# Patient Record
Sex: FEMALE | NOT HISPANIC OR LATINO | Employment: OTHER | URBAN - METROPOLITAN AREA
[De-identification: names, ages, dates, MRNs, and addresses within clinical notes are randomized per-mention and may not be internally consistent; named-entity substitution may affect disease eponyms.]

---

## 2019-07-19 ENCOUNTER — HOSPITAL ENCOUNTER (EMERGENCY)
Facility: HOSPITAL | Age: 29
Discharge: LEFT BEFORE DISCHARGE | End: 2019-07-20
Payer: MEDICAID

## 2019-07-19 VITALS
TEMPERATURE: 98 F | RESPIRATION RATE: 18 BRPM | HEART RATE: 100 BPM | WEIGHT: 157.44 LBS | DIASTOLIC BLOOD PRESSURE: 83 MMHG | OXYGEN SATURATION: 97 % | SYSTOLIC BLOOD PRESSURE: 123 MMHG

## 2019-07-19 DIAGNOSIS — Z53.20 LEFT BEFORE TREATMENT COMPLETED: Primary | ICD-10-CM

## 2019-07-19 DIAGNOSIS — R10.84 GENERALIZED ABDOMINAL PAIN: ICD-10-CM

## 2019-07-19 LAB
B-HCG UR QL: NEGATIVE
BILIRUB UR QL STRIP: NEGATIVE
CLARITY UR: CLEAR
COLOR UR: YELLOW
GLUCOSE UR QL STRIP: NEGATIVE
HGB UR QL STRIP: ABNORMAL
KETONES UR QL STRIP: NEGATIVE
LEUKOCYTE ESTERASE UR QL STRIP: NEGATIVE
MICROSCOPIC COMMENT: NORMAL
NITRITE UR QL STRIP: NEGATIVE
PH UR STRIP: 6 [PH] (ref 5–8)
PROT UR QL STRIP: NEGATIVE
RBC #/AREA URNS HPF: 1 /HPF (ref 0–4)
SP GR UR STRIP: 1.02 (ref 1–1.03)
URN SPEC COLLECT METH UR: ABNORMAL
UROBILINOGEN UR STRIP-ACNC: 1 EU/DL
WBC #/AREA URNS HPF: 1 /HPF (ref 0–5)

## 2019-07-19 PROCEDURE — 99285 EMERGENCY DEPT VISIT HI MDM: CPT

## 2019-07-19 PROCEDURE — 81025 URINE PREGNANCY TEST: CPT

## 2019-07-19 PROCEDURE — 81000 URINALYSIS NONAUTO W/SCOPE: CPT

## 2019-07-20 LAB
ALBUMIN SERPL BCP-MCNC: 4.2 G/DL (ref 3.5–5.2)
ALP SERPL-CCNC: 79 U/L (ref 55–135)
ALT SERPL W/O P-5'-P-CCNC: 50 U/L (ref 10–44)
ANION GAP SERPL CALC-SCNC: 13 MMOL/L (ref 8–16)
AST SERPL-CCNC: 33 U/L (ref 10–40)
BASOPHILS # BLD AUTO: 0.02 K/UL (ref 0–0.2)
BASOPHILS NFR BLD: 0.2 % (ref 0–1.9)
BILIRUB SERPL-MCNC: 0.6 MG/DL (ref 0.1–1)
BUN SERPL-MCNC: 15 MG/DL (ref 6–20)
CALCIUM SERPL-MCNC: 9.2 MG/DL (ref 8.7–10.5)
CHLORIDE SERPL-SCNC: 109 MMOL/L (ref 95–110)
CO2 SERPL-SCNC: 18 MMOL/L (ref 23–29)
CREAT SERPL-MCNC: 1.1 MG/DL (ref 0.5–1.4)
DIFFERENTIAL METHOD: ABNORMAL
EOSINOPHIL # BLD AUTO: 0.6 K/UL (ref 0–0.5)
EOSINOPHIL NFR BLD: 6.7 % (ref 0–8)
ERYTHROCYTE [DISTWIDTH] IN BLOOD BY AUTOMATED COUNT: 13 % (ref 11.5–14.5)
EST. GFR  (AFRICAN AMERICAN): >60 ML/MIN/1.73 M^2
EST. GFR  (NON AFRICAN AMERICAN): >60 ML/MIN/1.73 M^2
GLUCOSE SERPL-MCNC: 101 MG/DL (ref 70–110)
HCT VFR BLD AUTO: 34.9 % (ref 37–48.5)
HGB BLD-MCNC: 12.1 G/DL (ref 12–16)
LIPASE SERPL-CCNC: 20 U/L (ref 4–60)
LYMPHOCYTES # BLD AUTO: 3.8 K/UL (ref 1–4.8)
LYMPHOCYTES NFR BLD: 43 % (ref 18–48)
MCH RBC QN AUTO: 30.4 PG (ref 27–31)
MCHC RBC AUTO-ENTMCNC: 34.7 G/DL (ref 32–36)
MCV RBC AUTO: 88 FL (ref 82–98)
MONOCYTES # BLD AUTO: 0.9 K/UL (ref 0.3–1)
MONOCYTES NFR BLD: 10.1 % (ref 4–15)
NEUTROPHILS # BLD AUTO: 3.6 K/UL (ref 1.8–7.7)
NEUTROPHILS NFR BLD: 40.3 % (ref 38–73)
PLATELET # BLD AUTO: 315 K/UL (ref 150–350)
PMV BLD AUTO: 9.2 FL (ref 9.2–12.9)
POTASSIUM SERPL-SCNC: 3.5 MMOL/L (ref 3.5–5.1)
PROT SERPL-MCNC: 7.5 G/DL (ref 6–8.4)
RBC # BLD AUTO: 3.98 M/UL (ref 4–5.4)
SODIUM SERPL-SCNC: 140 MMOL/L (ref 136–145)
WBC # BLD AUTO: 8.92 K/UL (ref 3.9–12.7)

## 2019-07-20 PROCEDURE — 80053 COMPREHEN METABOLIC PANEL: CPT

## 2019-07-20 PROCEDURE — 85025 COMPLETE CBC W/AUTO DIFF WBC: CPT

## 2019-07-20 PROCEDURE — 80074 ACUTE HEPATITIS PANEL: CPT

## 2019-07-20 PROCEDURE — 83690 ASSAY OF LIPASE: CPT

## 2019-07-20 NOTE — ED PROVIDER NOTES
History      Chief Complaint   Patient presents with    Abdominal Pain     abdominal pain x one week. +dysuria. Mother sts pt was told that her liver enzymes were elevated       Review of patient's allergies indicates:   Allergen Reactions    Moxifloxacin         HPI   HPI    7/20/2019, 12:36 AM   History obtained from the mother and patient      History of Present Illness: Aster العراقي is a 29 y.o. female patient with PMHX fibromyaglia, hypoglycemic, scoliosis presents to the Emergency Department with c/o generalized abdominal pain and dysuria x1 week.  The  pt denies any nausea, vomiting, diarrhea, fever, hematuria. They endorse generalized pain that radiates into bilateral flanks.  The patient reports was told had elevated liver enzymes by her primary care.  Patient reports blood work was drawn earlier in the week and she was told to have that re-evaluated.  She denies any constipation or change in stools, no change in skin, denies any previous liver or gallbladder problems.  No alleviating factors.  No exacerbating factors. Symptoms are moderate in severity. They deny any further complaints or concerns at this time.       Arrival mode: Personal vehicle      PCP: Ines Hou MD       Past Medical History:  Past Medical History:   Diagnosis Date    Fibromyalgia     Hypoglycemic syndrome     Scoliosis        Past Surgical History:  No past surgical history on file.      Family History:  No family history on file.    Social History:  Social History     Tobacco Use    Smoking status: Not on file   Substance and Sexual Activity    Alcohol use: Not on file    Drug use: Not on file    Sexual activity: Not on file       ROS   Review of Systems   Constitutional: Negative for chills and fever.   HENT: Negative for congestion, ear pain and sore throat.    Respiratory: Negative for chest tightness, shortness of breath and wheezing.    Cardiovascular: Negative for chest pain.   Gastrointestinal:  Positive for abdominal pain (generalized ). Negative for abdominal distention, constipation, diarrhea, nausea and vomiting.   Genitourinary: Positive for dysuria and flank pain. Negative for decreased urine volume, difficulty urinating, hematuria, vaginal bleeding and vaginal discharge.   Musculoskeletal: Negative for back pain and neck pain.   Skin: Negative for rash.   Neurological: Negative for dizziness, syncope, weakness, numbness and headaches.   Hematological: Does not bruise/bleed easily.       Physical Exam      Initial Vitals [07/19/19 2056]   BP Pulse Resp Temp SpO2   123/83 100 18 98.3 °F (36.8 °C) 97 %      MAP       --          Physical Exam   Constitutional: She is oriented to person, place, and time. Vital signs are normal. She appears well-developed and well-nourished.   HENT:   Head: Atraumatic.   Eyes: Pupils are equal, round, and reactive to light. Conjunctivae and EOM are normal.   Neck: Normal range of motion. Neck supple.   Cardiovascular: Normal rate, regular rhythm and normal heart sounds. Exam reveals no gallop and no friction rub.   No murmur heard.  Pulmonary/Chest: Breath sounds normal. She has no wheezes. She has no rhonchi. She has no rales.   Abdominal: Soft. Normal appearance and bowel sounds are normal. She exhibits no distension. There is generalized tenderness. There is no rigidity, no rebound, no guarding, no CVA tenderness, no tenderness at McBurney's point and negative Riley's sign.   Musculoskeletal: Normal range of motion. She exhibits no tenderness.   Neurological: She is alert and oriented to person, place, and time. She has normal strength.   Skin: Skin is warm, dry and intact. No rash noted.   Psychiatric: She has a normal mood and affect.   Nursing note and vitals reviewed.      Nursing Notes and Vital Signs Reviewed.    ED Course    Procedures  ED Vital Signs:  Vitals:    07/19/19 2056   BP: 123/83   Pulse: 100   Resp: 18   Temp: 98.3 °F (36.8 °C)   TempSrc: Oral    SpO2: 97%   Weight: 71.4 kg (157 lb 6.5 oz)       Abnormal Lab Results:  Labs Reviewed   URINALYSIS - Abnormal; Notable for the following components:       Result Value    Occult Blood UA 1+ (*)     All other components within normal limits   CBC W/ AUTO DIFFERENTIAL - Abnormal; Notable for the following components:    RBC 3.98 (*)     Hematocrit 34.9 (*)     Eos # 0.6 (*)     All other components within normal limits   COMPREHENSIVE METABOLIC PANEL - Abnormal; Notable for the following components:    CO2 18 (*)     ALT 50 (*)     All other components within normal limits   PREGNANCY TEST, URINE RAPID   URINALYSIS MICROSCOPIC   LIPASE   HEPATITIS PANEL, ACUTE        All Lab Results:  Results for orders placed or performed during the hospital encounter of 07/19/19   Urinalysis Clean Catch   Result Value Ref Range    Specimen UA Urine, Clean Catch     Color, UA Yellow Yellow, Straw, [place]    Appearance, UA Clear Clear    pH, UA 6.0 5.0 - 8.0    Specific Gravity, UA 1.020 1.005 - 1.030    Protein, UA Negative Negative    Glucose, UA Negative Negative    Ketones, UA Negative Negative    Bilirubin (UA) Negative Negative    Occult Blood UA 1+ (A) Negative    Nitrite, UA Negative Negative    Urobilinogen, UA 1.0 <2.0 EU/dL    Leukocytes, UA Negative Negative   Pregnancy, urine rapid (UPT)   Result Value Ref Range    Preg Test, Ur Negative    Urinalysis Microscopic   Result Value Ref Range    RBC, UA 1 0 - 4 /hpf    WBC, UA 1 0 - 5 /hpf    Microscopic Comment SEE COMMENT    CBC auto differential   Result Value Ref Range    WBC 8.92 3.90 - 12.70 K/uL    RBC 3.98 (L) 4.00 - 5.40 M/uL    Hemoglobin 12.1 12.0 - 16.0 g/dL    Hematocrit 34.9 (L) 37.0 - 48.5 %    Mean Corpuscular Volume 88 82 - 98 fL    Mean Corpuscular Hemoglobin 30.4 27.0 - 31.0 pg    Mean Corpuscular Hemoglobin Conc 34.7 32.0 - 36.0 g/dL    RDW 13.0 11.5 - 14.5 %    Platelets 315 150 - 350 K/uL    MPV 9.2 9.2 - 12.9 fL    Gran # (ANC) 3.6 1.8 - 7.7 K/uL     Lymph # 3.8 1.0 - 4.8 K/uL    Mono # 0.9 0.3 - 1.0 K/uL    Eos # 0.6 (H) 0.0 - 0.5 K/uL    Baso # 0.02 0.00 - 0.20 K/uL    Gran% 40.3 38.0 - 73.0 %    Lymph% 43.0 18.0 - 48.0 %    Mono% 10.1 4.0 - 15.0 %    Eosinophil% 6.7 0.0 - 8.0 %    Basophil% 0.2 0.0 - 1.9 %    Differential Method Automated    Comprehensive metabolic panel   Result Value Ref Range    Sodium 140 136 - 145 mmol/L    Potassium 3.5 3.5 - 5.1 mmol/L    Chloride 109 95 - 110 mmol/L    CO2 18 (L) 23 - 29 mmol/L    Glucose 101 70 - 110 mg/dL    BUN, Bld 15 6 - 20 mg/dL    Creatinine 1.1 0.5 - 1.4 mg/dL    Calcium 9.2 8.7 - 10.5 mg/dL    Total Protein 7.5 6.0 - 8.4 g/dL    Albumin 4.2 3.5 - 5.2 g/dL    Total Bilirubin 0.6 0.1 - 1.0 mg/dL    Alkaline Phosphatase 79 55 - 135 U/L    AST 33 10 - 40 U/L    ALT 50 (H) 10 - 44 U/L    Anion Gap 13 8 - 16 mmol/L    eGFR if African American >60 >60 mL/min/1.73 m^2    eGFR if non African American >60 >60 mL/min/1.73 m^2   Lipase   Result Value Ref Range    Lipase 20 4 - 60 U/L         Imaging Results:  Imaging Results          X-Ray Abdomen Flat And Erect (Final result)  Result time 07/20/19 00:02:47    Final result by Emperatriz Garibay MD (07/20/19 00:02:47)                 Impression:      Unremarkable exam.      Electronically signed by: Emperatriz Garibay  Date:    07/20/2019  Time:    00:02             Narrative:    EXAMINATION:  XR ABDOMEN FLAT AND ERECT    CLINICAL HISTORY:  Generalized abdominal pain    COMPARISON:  None    FINDINGS:  Bowel gas pattern is within normal limits.  No evidence of free air or pneumatosis.  Upright view of the chest is unremarkable.                               CT Renal Stone Study ABD Pelvis WO (In process)  Result time 07/19/19 23:40:59                        The Emergency Provider reviewed the vital signs and test results, which are outlined above.    ED Discussion     12:45 AM: The pt wanted update on status of treatment. She was informed still waiting on blood and  Ct results.  The patient was awake alert orient x3 she denied any pain, nausea, vomiting at this time.) Patient as soon as results were completed we would discuss further treatment plans.    1:14 AM: Went to Reassessed pt at this time. The pt, her mother, and all belongings were not in ED. The pt left ED prior to treatment completion and without informing any ED staff.     ED Medication(s):  Medications - No data to display          Medical Decision Making                     Clinical Impression       ICD-10-CM ICD-9-CM   1. Left before treatment completed Z91.19 V15.81   2. Generalized abdominal pain R10.84 789.07       Disposition:   Disposition: Eloped  Condition: Stable  Pt left prior to treatment completion          Monica Figueroa NP  07/20/19 0117

## 2019-07-20 NOTE — ED NOTES
Patient identifiers verified and correct for Aster العراقي.    LOC: The patient is awake, alert and aware of environment with an appropriate affect, the patient is oriented x 3 and speaking appropriately.  Appearance: Pt is resting in stretcher, no acute distress is noted. Clothing and hygiene are appropriate.   Skin: The skin is warm and dry, color consistent with ethnicity, patient has normal skin turgor and moist mucus membranes, skin intact, no breakdown or bruising noted.  Musculoskeletal: Moves all extremities well, full range of motion. No obvious deformities noted.   Respiratory: Airway open and patent. Respiration rate even and unlabored. No use of accessory muscles noted.   Cardiac: Patient has a normal rate, no periphreal edema noted, capillary refill < 3 seconds.  Abdomen: No distension noted. Soft and non-tender to palpation.  Neurologic: Symmetrical expression noted in face. Hand grasps equal. Normal sensation reported in all extremities. No obvious neurological deficits noted.   : WNL except pt c/o R side flank pain that radiates to lower abdomen. +dysuria.

## 2019-07-22 LAB
HAV IGM SERPL QL IA: NEGATIVE
HBV CORE IGM SERPL QL IA: NEGATIVE
HBV SURFACE AG SERPL QL IA: NEGATIVE
HCV AB SERPL QL IA: NEGATIVE

## 2020-11-26 ENCOUNTER — HOSPITAL ENCOUNTER (EMERGENCY)
Facility: HOSPITAL | Age: 30
Discharge: HOME | End: 2020-11-27
Attending: FAMILY MEDICINE
Payer: MEDICAID

## 2020-11-26 DIAGNOSIS — F19.10 DRUG ABUSE: Primary | ICD-10-CM

## 2020-11-26 DIAGNOSIS — R41.82 AMS (ALTERED MENTAL STATUS): ICD-10-CM

## 2020-11-26 DIAGNOSIS — R40.20 LOC (LOSS OF CONSCIOUSNESS): ICD-10-CM

## 2020-11-26 LAB
ALBUMIN SERPL BCP-MCNC: 5.2 G/DL (ref 3.5–5.2)
ALP SERPL-CCNC: 120 U/L (ref 55–135)
ALT SERPL W/O P-5'-P-CCNC: 41 U/L (ref 10–44)
AMPHET+METHAMPHET UR QL: NORMAL
ANION GAP SERPL CALC-SCNC: 14 MMOL/L (ref 8–16)
APAP SERPL-MCNC: 9 UG/ML (ref 10–20)
AST SERPL-CCNC: 63 U/L (ref 10–40)
B-HCG UR QL: NEGATIVE
BARBITURATES UR QL SCN>200 NG/ML: NEGATIVE
BASOPHILS # BLD AUTO: 0.06 K/UL (ref 0–0.2)
BASOPHILS NFR BLD: 0.8 % (ref 0–1.9)
BENZODIAZ UR QL SCN>200 NG/ML: NORMAL
BILIRUB SERPL-MCNC: 0.8 MG/DL (ref 0.1–1)
BILIRUB UR QL STRIP: NEGATIVE
BNP SERPL-MCNC: <10 PG/ML (ref 0–99)
BUN SERPL-MCNC: 8 MG/DL (ref 6–20)
BZE UR QL SCN: NEGATIVE
CALCIUM SERPL-MCNC: 10.6 MG/DL (ref 8.7–10.5)
CANNABINOIDS UR QL SCN: NEGATIVE
CHLORIDE SERPL-SCNC: 105 MMOL/L (ref 95–110)
CK SERPL-CCNC: 134 U/L (ref 20–180)
CLARITY UR: CLEAR
CO2 SERPL-SCNC: 22 MMOL/L (ref 23–29)
COLOR UR: YELLOW
CREAT SERPL-MCNC: 1.2 MG/DL (ref 0.5–1.4)
CREAT UR-MCNC: 135 MG/DL (ref 15–325)
DIFFERENTIAL METHOD: ABNORMAL
EOSINOPHIL # BLD AUTO: 0.1 K/UL (ref 0–0.5)
EOSINOPHIL NFR BLD: 1.6 % (ref 0–8)
ERYTHROCYTE [DISTWIDTH] IN BLOOD BY AUTOMATED COUNT: 12.6 % (ref 11.5–14.5)
EST. GFR  (AFRICAN AMERICAN): >60 ML/MIN/1.73 M^2
EST. GFR  (NON AFRICAN AMERICAN): >60 ML/MIN/1.73 M^2
ETHANOL SERPL-MCNC: <10 MG/DL
GLUCOSE SERPL-MCNC: 86 MG/DL (ref 70–110)
GLUCOSE UR QL STRIP: NEGATIVE
HCT VFR BLD AUTO: 48.1 % (ref 37–48.5)
HCV AB SERPL QL IA: NEGATIVE
HGB BLD-MCNC: 15.4 G/DL (ref 12–16)
HGB UR QL STRIP: ABNORMAL
HIV 1+2 AB+HIV1 P24 AG SERPL QL IA: NEGATIVE
IMM GRANULOCYTES # BLD AUTO: 0.01 K/UL (ref 0–0.04)
IMM GRANULOCYTES NFR BLD AUTO: 0.1 % (ref 0–0.5)
KETONES UR QL STRIP: ABNORMAL
LEUKOCYTE ESTERASE UR QL STRIP: NEGATIVE
LYMPHOCYTES # BLD AUTO: 3.3 K/UL (ref 1–4.8)
LYMPHOCYTES NFR BLD: 44 % (ref 18–48)
MAGNESIUM SERPL-MCNC: 2.5 MG/DL (ref 1.6–2.6)
MCH RBC QN AUTO: 29.6 PG (ref 27–31)
MCHC RBC AUTO-ENTMCNC: 32 G/DL (ref 32–36)
MCV RBC AUTO: 92 FL (ref 82–98)
METHADONE UR QL SCN>300 NG/ML: NEGATIVE
MICROSCOPIC COMMENT: NORMAL
MONOCYTES # BLD AUTO: 0.5 K/UL (ref 0.3–1)
MONOCYTES NFR BLD: 7 % (ref 4–15)
NEUTROPHILS # BLD AUTO: 3.5 K/UL (ref 1.8–7.7)
NEUTROPHILS NFR BLD: 46.5 % (ref 38–73)
NITRITE UR QL STRIP: NEGATIVE
NRBC BLD-RTO: 0 /100 WBC
OPIATES UR QL SCN: NORMAL
PCP UR QL SCN>25 NG/ML: NEGATIVE
PH UR STRIP: 6 [PH] (ref 5–8)
PLATELET # BLD AUTO: 372 K/UL (ref 150–350)
PMV BLD AUTO: 10.1 FL (ref 9.2–12.9)
POTASSIUM SERPL-SCNC: 4.9 MMOL/L (ref 3.5–5.1)
PROT SERPL-MCNC: 9.7 G/DL (ref 6–8.4)
PROT UR QL STRIP: NEGATIVE
RBC # BLD AUTO: 5.21 M/UL (ref 4–5.4)
RBC #/AREA URNS HPF: 2 /HPF (ref 0–4)
SALICYLATES SERPL-MCNC: <5 MG/DL (ref 15–30)
SODIUM SERPL-SCNC: 141 MMOL/L (ref 136–145)
SP GR UR STRIP: 1.02 (ref 1–1.03)
SQUAMOUS #/AREA URNS HPF: 2 /HPF
TOXICOLOGY INFORMATION: NORMAL
TROPONIN I SERPL DL<=0.01 NG/ML-MCNC: <0.006 NG/ML (ref 0–0.03)
TSH SERPL DL<=0.005 MIU/L-ACNC: 1.25 UIU/ML (ref 0.4–4)
URN SPEC COLLECT METH UR: ABNORMAL
UROBILINOGEN UR STRIP-ACNC: NEGATIVE EU/DL
WBC # BLD AUTO: 7.47 K/UL (ref 3.9–12.7)

## 2020-11-26 PROCEDURE — 93010 EKG 12-LEAD: ICD-10-PCS | Mod: ,,, | Performed by: INTERNAL MEDICINE

## 2020-11-26 PROCEDURE — 82550 ASSAY OF CK (CPK): CPT

## 2020-11-26 PROCEDURE — 83880 ASSAY OF NATRIURETIC PEPTIDE: CPT

## 2020-11-26 PROCEDURE — 93010 ELECTROCARDIOGRAM REPORT: CPT | Mod: ,,, | Performed by: INTERNAL MEDICINE

## 2020-11-26 PROCEDURE — 36415 COLL VENOUS BLD VENIPUNCTURE: CPT

## 2020-11-26 PROCEDURE — 86703 HIV-1/HIV-2 1 RESULT ANTBDY: CPT

## 2020-11-26 PROCEDURE — 80307 DRUG TEST PRSMV CHEM ANLYZR: CPT

## 2020-11-26 PROCEDURE — 96376 TX/PRO/DX INJ SAME DRUG ADON: CPT

## 2020-11-26 PROCEDURE — 96361 HYDRATE IV INFUSION ADD-ON: CPT

## 2020-11-26 PROCEDURE — 80053 COMPREHEN METABOLIC PANEL: CPT

## 2020-11-26 PROCEDURE — 84443 ASSAY THYROID STIM HORMONE: CPT

## 2020-11-26 PROCEDURE — 86803 HEPATITIS C AB TEST: CPT

## 2020-11-26 PROCEDURE — 81000 URINALYSIS NONAUTO W/SCOPE: CPT | Mod: 59

## 2020-11-26 PROCEDURE — 81025 URINE PREGNANCY TEST: CPT

## 2020-11-26 PROCEDURE — 85025 COMPLETE CBC W/AUTO DIFF WBC: CPT

## 2020-11-26 PROCEDURE — 63600175 PHARM REV CODE 636 W HCPCS: Performed by: FAMILY MEDICINE

## 2020-11-26 PROCEDURE — 84484 ASSAY OF TROPONIN QUANT: CPT

## 2020-11-26 PROCEDURE — 83735 ASSAY OF MAGNESIUM: CPT

## 2020-11-26 PROCEDURE — 80329 ANALGESICS NON-OPIOID 1 OR 2: CPT

## 2020-11-26 PROCEDURE — 25000003 PHARM REV CODE 250: Performed by: FAMILY MEDICINE

## 2020-11-26 PROCEDURE — 93005 ELECTROCARDIOGRAM TRACING: CPT

## 2020-11-26 PROCEDURE — 80320 DRUG SCREEN QUANTALCOHOLS: CPT

## 2020-11-26 PROCEDURE — 99291 CRITICAL CARE FIRST HOUR: CPT | Mod: 25

## 2020-11-26 PROCEDURE — 96374 THER/PROPH/DIAG INJ IV PUSH: CPT

## 2020-11-26 RX ORDER — GABAPENTIN 100 MG/1
800 CAPSULE ORAL 3 TIMES DAILY
COMMUNITY
End: 2020-11-27

## 2020-11-26 RX ORDER — NALOXONE HCL 0.4 MG/ML
1 VIAL (ML) INJECTION
Status: COMPLETED | OUTPATIENT
Start: 2020-11-26 | End: 2020-11-26

## 2020-11-26 RX ORDER — TIZANIDINE 4 MG/1
TABLET ORAL 3 TIMES DAILY
COMMUNITY
End: 2020-11-27

## 2020-11-26 RX ORDER — EMTRICITABINE AND TENOFOVIR DISOPROXIL FUMARATE 200; 300 MG/1; MG/1
1 TABLET, FILM COATED ORAL
COMMUNITY
Start: 2020-11-07 | End: 2020-12-05

## 2020-11-26 RX ORDER — RALTEGRAVIR 400 MG/1
400 TABLET, FILM COATED ORAL
COMMUNITY
Start: 2020-11-07 | End: 2020-12-05

## 2020-11-26 RX ADMIN — SODIUM CHLORIDE 1000 ML: 0.9 INJECTION, SOLUTION INTRAVENOUS at 08:11

## 2020-11-26 RX ADMIN — NALXONE HYDROCHLORIDE 1 MG: 0.4 INJECTION INTRAMUSCULAR; INTRAVENOUS; SUBCUTANEOUS at 10:11

## 2020-11-26 RX ADMIN — NALXONE HYDROCHLORIDE 1 MG: 0.4 INJECTION INTRAMUSCULAR; INTRAVENOUS; SUBCUTANEOUS at 08:11

## 2020-11-26 RX ADMIN — SODIUM CHLORIDE, SODIUM LACTATE, POTASSIUM CHLORIDE, AND CALCIUM CHLORIDE 1000 ML: .6; .31; .03; .02 INJECTION, SOLUTION INTRAVENOUS at 10:11

## 2020-11-27 VITALS
OXYGEN SATURATION: 100 % | HEART RATE: 77 BPM | DIASTOLIC BLOOD PRESSURE: 72 MMHG | SYSTOLIC BLOOD PRESSURE: 103 MMHG | RESPIRATION RATE: 20 BRPM | WEIGHT: 152.56 LBS | TEMPERATURE: 98 F

## 2020-11-27 RX ORDER — NALOXONE HYDROCHLORIDE 4 MG/.1ML
1 SPRAY NASAL ONCE
Qty: 1 EACH | Refills: 0 | Status: SHIPPED | OUTPATIENT
Start: 2020-11-27 | End: 2020-11-27

## 2020-11-27 NOTE — DISCHARGE INSTRUCTIONS
Regarding DRUG ADDICTION, the patient was advised that:  an overdose of controlled substances, illegal drugs, or narcotics, can lead to a coma and death; HIV, AIDS, and hepatitis B and C are serious diseases that can be spread through needles, syringes, and other supplies used to inject narcotics; one may also get an infection if injecting narcotics using dirty needles or supplies; illegal narcotics may be mixed with things like talcum powder, baking soda, or poisons that may get stuck in veins and cause infections or clots that may travel to the heart, lungs, or brain and cause death; snorting or sniffing heroin may cause a hole to develop in the cartilage that separates the two sides of the nose; using pure heroin  increases the risks of an overdose and death; and black tar heroin may contain the bacteria that causes botulism which can be life-threatening.
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
## 2021-03-09 PROBLEM — R46.2 BIZARRE BEHAVIOR: Status: ACTIVE | Noted: 2021-03-09

## 2021-03-10 PROBLEM — M79.7 FIBROMYALGIA: Status: ACTIVE | Noted: 2021-03-10

## 2021-03-10 PROBLEM — N39.0 UTI (URINARY TRACT INFECTION): Status: ACTIVE | Noted: 2021-03-10

## 2021-03-10 PROBLEM — Z13.9 ENCOUNTER FOR MEDICAL SCREENING EXAMINATION: Status: ACTIVE | Noted: 2021-03-10

## 2021-03-11 PROBLEM — R46.2 BIZARRE BEHAVIOR: Status: RESOLVED | Noted: 2021-03-09 | Resolved: 2021-03-11

## 2021-06-14 PROBLEM — Z13.9 ENCOUNTER FOR MEDICAL SCREENING EXAMINATION: Status: RESOLVED | Noted: 2021-03-10 | Resolved: 2021-06-14
